# Patient Record
Sex: FEMALE | Race: BLACK OR AFRICAN AMERICAN | NOT HISPANIC OR LATINO | Employment: FULL TIME | URBAN - METROPOLITAN AREA
[De-identification: names, ages, dates, MRNs, and addresses within clinical notes are randomized per-mention and may not be internally consistent; named-entity substitution may affect disease eponyms.]

---

## 2017-06-30 ENCOUNTER — TELEPHONE (OUTPATIENT)
Dept: DERMATOLOGY | Facility: CLINIC | Age: 32
End: 2017-06-30

## 2017-06-30 NOTE — TELEPHONE ENCOUNTER
Contacted patient after receiving referral to schedule an appointment. Patient phone doesn't work.

## 2017-09-06 ENCOUNTER — OFFICE VISIT (OUTPATIENT)
Dept: PODIATRY | Facility: CLINIC | Age: 32
End: 2017-09-06
Payer: COMMERCIAL

## 2017-09-06 VITALS
WEIGHT: 202.81 LBS | DIASTOLIC BLOOD PRESSURE: 77 MMHG | BODY MASS INDEX: 37.32 KG/M2 | RESPIRATION RATE: 18 BRPM | HEIGHT: 62 IN | HEART RATE: 67 BPM | SYSTOLIC BLOOD PRESSURE: 127 MMHG

## 2017-09-06 DIAGNOSIS — Q82.8 POROKERATOSIS: ICD-10-CM

## 2017-09-06 DIAGNOSIS — B07.0 PLANTAR WART: Primary | ICD-10-CM

## 2017-09-06 PROCEDURE — 99999 PR PBB SHADOW E&M-EST. PATIENT-LVL III: CPT | Mod: PBBFAC,,, | Performed by: PODIATRIST

## 2017-09-06 PROCEDURE — 3008F BODY MASS INDEX DOCD: CPT | Mod: S$GLB,,, | Performed by: PODIATRIST

## 2017-09-06 PROCEDURE — 17110 DESTRUCTION B9 LES UP TO 14: CPT | Mod: S$GLB,,, | Performed by: PODIATRIST

## 2017-09-06 PROCEDURE — 99203 OFFICE O/P NEW LOW 30 MIN: CPT | Mod: 25,S$GLB,, | Performed by: PODIATRIST

## 2017-09-06 RX ORDER — UREA 40 %
CREAM (GRAM) TOPICAL 3 TIMES DAILY
Qty: 198.5 G | Refills: 4 | Status: SHIPPED | OUTPATIENT
Start: 2017-09-06 | End: 2017-09-06 | Stop reason: CLARIF

## 2017-09-06 RX ORDER — UREA 40 %
CREAM (GRAM) TOPICAL 3 TIMES DAILY
Qty: 198.5 G | Refills: 4 | Status: SHIPPED | OUTPATIENT
Start: 2017-09-06 | End: 2017-10-06

## 2017-09-06 NOTE — PROGRESS NOTES
Subjective:      Patient ID: Hilda Hercules is a 32 y.o. female.    Chief Complaint: Foot Problem (plantar wart on left foot x 1 year// No regular MD)    Hilda Hercules is a  32 y.o. year old female presenting to podiatry clinic for complaint of painful lesion on the bottom of the left foot for 1 years. The patient describes the pain as a 8/10 stabbing irritation mainly with weightbearing activities. The patient has Memory foam insoles. Patient does not recall any injuries or inciting events that may have triggered this problem.      Review of Systems   Constitution: Negative for chills and fever.   Cardiovascular: Negative for chest pain.   Respiratory: Negative for shortness of breath.    Gastrointestinal: Negative for nausea and vomiting.           Objective:      Physical Exam   Constitutional: She is oriented to person, place, and time. She appears well-developed and well-nourished. No distress.   Cardiovascular:   Pulses:       Dorsalis pedis pulses are 2+ on the right side, and 2+ on the left side.        Posterior tibial pulses are 2+ on the right side, and 2+ on the left side.   Capillary fill time 3 seconds to digits bilateral feet.   Musculoskeletal:   Lower extremities:    Deformities: Plantarflexed 5th met head left foot.    Normal arch height and rectus feet bilaterally.     5/5 muscle strength and tone in all four quadrants bilaterally.     Pain-free range of motion in all four quadrants WNL bilaterally.     Pain: Sub 5th met head worse with side to side compression left foot.   Neurological: She is alert and oriented to person, place, and time. She displays no Babinski's sign on the right side. She displays no Babinski's sign on the left side.   Plantar protective sensation by touch via 5.07 Linwood Laxmi monofilament present bilaterally.   Skin:   Lower extremities:    Normal turgor, texture, temperature bilaterally. Digital hair present bilaterally. Warm equally bilaterally.    No varicosities,  pigmentary changes.    Mild fullness sub 5th met head left foot.    No wounds, drainage, malodor, erythema, interdigital maceration were noted.     Keratotic build up sub 5th met head left foot with nucleation, displaced skin lines and punctate bleeding.    Nails are clear bilaterally.   Psychiatric: She has a normal mood and affect.   Nursing note and vitals reviewed.            Assessment:       Encounter Diagnoses   Name Primary?    Plantar wart Yes    Porokeratosis          Plan:       Hilda was seen today for foot problem.    Diagnoses and all orders for this visit:    Plantar wart    Porokeratosis    Other orders  -     Discontinue: urea (CARMOL) 40 % Crea; Apply topically 3 (three) times daily. Apply to rough skin on feet  -     urea (CARMOL) 40 % Crea; Apply topically 3 (three) times daily. Apply to rough skin on feet      I counseled the patient on her conditions, their implications and medical management.    At this time discussed treatment of keratotic verrucous lesion. The patient does wish to treat the painful verrucous lesions and proceed with chemical destruction of verrucous lesions x1. Following sharp debridement, three applications of phenol were topically applied to lesions followed by trichloroacetic acid. The lesions were offloaded with foam cut out pad and occluded with silk tape.  The patient was instructed to leave the lesions under occlusion for three days. The patient was instructed to remove the dressings earlier and thoroughly cleanse the area if any excessive irritation occurs.  The patient was also given a prescription for urea lotion to apply to the lesion site after removal of dressing.           .

## 2017-09-06 NOTE — PATIENT INSTRUCTIONS
Keep dressings clean, dry, and intact for 3-4 days. Apply urea lotion as directed following removal of dressings. Remove dressings earlier and cleanse thoroughly if you experience any burning or irritation.    Apply cream or lotion to skin lesions as directed.  Eucerin or Amlactin as alternatives.

## 2017-12-13 ENCOUNTER — OFFICE VISIT (OUTPATIENT)
Dept: OPHTHALMOLOGY | Facility: CLINIC | Age: 32
End: 2017-12-13
Payer: COMMERCIAL

## 2017-12-13 DIAGNOSIS — H52.209 MYOPIA WITH ASTIGMATISM, UNSPECIFIED LATERALITY: ICD-10-CM

## 2017-12-13 DIAGNOSIS — Z01.00 ENCOUNTER FOR EYE EXAM: Primary | ICD-10-CM

## 2017-12-13 DIAGNOSIS — H52.10 MYOPIA WITH ASTIGMATISM, UNSPECIFIED LATERALITY: ICD-10-CM

## 2017-12-13 DIAGNOSIS — Z46.0 ENCOUNTER FOR FITTING OR ADJUSTMENT OF SPECTACLES OR CONTACT LENSES: ICD-10-CM

## 2017-12-13 PROCEDURE — 99999 PR PBB SHADOW E&M-EST. PATIENT-LVL I: CPT | Mod: PBBFAC,,, | Performed by: OPTOMETRIST

## 2017-12-13 PROCEDURE — 92015 DETERMINE REFRACTIVE STATE: CPT | Mod: S$GLB,,, | Performed by: OPTOMETRIST

## 2017-12-13 PROCEDURE — 92004 COMPRE OPH EXAM NEW PT 1/>: CPT | Mod: S$GLB,,, | Performed by: OPTOMETRIST

## 2017-12-13 PROCEDURE — 92310 CONTACT LENS FITTING OU: CPT | Mod: ,,, | Performed by: OPTOMETRIST

## 2017-12-13 NOTE — PROGRESS NOTES
HPI     No visual complaints. New patient last eye exam 2016. Update glasses and   contact lenses RX.  Discussed fee to update contact lenses.    Last edited by Ned Church, OD on 12/13/2017 11:08 AM. (History)            Assessment /Plan     For exam results, see Encounter Report.    Encounter for eye exam    Encounter for fitting or adjustment of spectacles or contact lenses    Myopia with astigmatism, unspecified laterality      Pt states previous contacts brand was Air Optix.  After attempting to fit with Air Optix Astig, pt says she has always worn color lenses.    Trials of Air Optix vision was slightly blurry due to not having astig correction.    Dispensed Rx for Freshlook Colorblends.    Discussed CL charges.  Dispense Final Rx for glasses  Note changes CL Rx  RTC 1 year DFE

## 2018-01-23 ENCOUNTER — TELEPHONE (OUTPATIENT)
Dept: DERMATOLOGY | Facility: CLINIC | Age: 33
End: 2018-01-23

## 2018-11-07 ENCOUNTER — OFFICE VISIT (OUTPATIENT)
Dept: INTERNAL MEDICINE | Facility: CLINIC | Age: 33
End: 2018-11-07
Payer: COMMERCIAL

## 2018-11-07 ENCOUNTER — CLINICAL SUPPORT (OUTPATIENT)
Dept: INTERNAL MEDICINE | Facility: CLINIC | Age: 33
End: 2018-11-07
Payer: COMMERCIAL

## 2018-11-07 ENCOUNTER — APPOINTMENT (OUTPATIENT)
Dept: RADIOLOGY | Facility: HOSPITAL | Age: 33
End: 2018-11-07
Attending: INTERNAL MEDICINE
Payer: COMMERCIAL

## 2018-11-07 ENCOUNTER — LAB VISIT (OUTPATIENT)
Dept: LAB | Facility: HOSPITAL | Age: 33
End: 2018-11-07
Attending: INTERNAL MEDICINE
Payer: COMMERCIAL

## 2018-11-07 VITALS
TEMPERATURE: 99 F | HEART RATE: 80 BPM | SYSTOLIC BLOOD PRESSURE: 110 MMHG | BODY MASS INDEX: 36.58 KG/M2 | DIASTOLIC BLOOD PRESSURE: 76 MMHG | WEIGHT: 200 LBS

## 2018-11-07 DIAGNOSIS — R05.9 COUGH: ICD-10-CM

## 2018-11-07 DIAGNOSIS — Z76.89 ENCOUNTER TO ESTABLISH CARE: ICD-10-CM

## 2018-11-07 DIAGNOSIS — Z76.89 ENCOUNTER TO ESTABLISH CARE: Primary | ICD-10-CM

## 2018-11-07 DIAGNOSIS — R07.89 OTHER CHEST PAIN: ICD-10-CM

## 2018-11-07 DIAGNOSIS — F17.200 CURRENT EVERY DAY SMOKER: ICD-10-CM

## 2018-11-07 DIAGNOSIS — E66.9 OBESITY (BMI 35.0-39.9 WITHOUT COMORBIDITY): ICD-10-CM

## 2018-11-07 LAB
25(OH)D3+25(OH)D2 SERPL-MCNC: 6 NG/ML
ALBUMIN SERPL BCP-MCNC: 4.1 G/DL
ALP SERPL-CCNC: 55 U/L
ALT SERPL W/O P-5'-P-CCNC: 11 U/L
ANION GAP SERPL CALC-SCNC: 9 MMOL/L
AST SERPL-CCNC: 17 U/L
BASOPHILS # BLD AUTO: 0.06 K/UL
BASOPHILS NFR BLD: 0.8 %
BILIRUB SERPL-MCNC: 0.3 MG/DL
BUN SERPL-MCNC: 10 MG/DL
CALCIUM SERPL-MCNC: 9.3 MG/DL
CHLORIDE SERPL-SCNC: 104 MMOL/L
CHOLEST SERPL-MCNC: 160 MG/DL
CHOLEST/HDLC SERPL: 3 {RATIO}
CO2 SERPL-SCNC: 25 MMOL/L
CREAT SERPL-MCNC: 0.8 MG/DL
DIFFERENTIAL METHOD: ABNORMAL
EOSINOPHIL # BLD AUTO: 0.2 K/UL
EOSINOPHIL NFR BLD: 2.8 %
ERYTHROCYTE [DISTWIDTH] IN BLOOD BY AUTOMATED COUNT: 16 %
EST. GFR  (AFRICAN AMERICAN): >60 ML/MIN/1.73 M^2
EST. GFR  (NON AFRICAN AMERICAN): >60 ML/MIN/1.73 M^2
GLUCOSE SERPL-MCNC: 75 MG/DL
HCT VFR BLD AUTO: 43 %
HDLC SERPL-MCNC: 54 MG/DL
HDLC SERPL: 33.8 %
HGB BLD-MCNC: 14.4 G/DL
IMM GRANULOCYTES # BLD AUTO: 0.02 K/UL
IMM GRANULOCYTES NFR BLD AUTO: 0.3 %
LDLC SERPL CALC-MCNC: 94.4 MG/DL
LYMPHOCYTES # BLD AUTO: 3.1 K/UL
LYMPHOCYTES NFR BLD: 39.9 %
MCH RBC QN AUTO: 27.5 PG
MCHC RBC AUTO-ENTMCNC: 33.5 G/DL
MCV RBC AUTO: 82 FL
MONOCYTES # BLD AUTO: 0.9 K/UL
MONOCYTES NFR BLD: 11.5 %
NEUTROPHILS # BLD AUTO: 3.5 K/UL
NEUTROPHILS NFR BLD: 44.7 %
NONHDLC SERPL-MCNC: 106 MG/DL
NRBC BLD-RTO: 0 /100 WBC
PLATELET # BLD AUTO: 245 K/UL
PMV BLD AUTO: 12.8 FL
POTASSIUM SERPL-SCNC: 3.5 MMOL/L
PROT SERPL-MCNC: 7.8 G/DL
RBC # BLD AUTO: 5.24 M/UL
SODIUM SERPL-SCNC: 138 MMOL/L
TRIGL SERPL-MCNC: 58 MG/DL
WBC # BLD AUTO: 7.82 K/UL

## 2018-11-07 PROCEDURE — 99999 PR PBB SHADOW E&M-EST. PATIENT-LVL III: CPT | Mod: PBBFAC,,, | Performed by: INTERNAL MEDICINE

## 2018-11-07 PROCEDURE — 93005 ELECTROCARDIOGRAM TRACING: CPT | Mod: S$GLB,,, | Performed by: INTERNAL MEDICINE

## 2018-11-07 PROCEDURE — 99204 OFFICE O/P NEW MOD 45 MIN: CPT | Mod: S$GLB,,, | Performed by: INTERNAL MEDICINE

## 2018-11-07 PROCEDURE — 85025 COMPLETE CBC W/AUTO DIFF WBC: CPT

## 2018-11-07 PROCEDURE — 3008F BODY MASS INDEX DOCD: CPT | Mod: CPTII,S$GLB,, | Performed by: INTERNAL MEDICINE

## 2018-11-07 PROCEDURE — 80061 LIPID PANEL: CPT

## 2018-11-07 PROCEDURE — 71046 X-RAY EXAM CHEST 2 VIEWS: CPT | Mod: 26,,, | Performed by: RADIOLOGY

## 2018-11-07 PROCEDURE — 36415 COLL VENOUS BLD VENIPUNCTURE: CPT | Mod: PO

## 2018-11-07 PROCEDURE — 80053 COMPREHEN METABOLIC PANEL: CPT

## 2018-11-07 PROCEDURE — 71046 X-RAY EXAM CHEST 2 VIEWS: CPT | Mod: TC,PO

## 2018-11-07 PROCEDURE — 93010 ELECTROCARDIOGRAM REPORT: CPT | Mod: S$GLB,,, | Performed by: INTERNAL MEDICINE

## 2018-11-07 PROCEDURE — 82306 VITAMIN D 25 HYDROXY: CPT

## 2018-11-07 RX ORDER — BUPROPION HYDROCHLORIDE 150 MG/1
150 TABLET, EXTENDED RELEASE ORAL 2 TIMES DAILY
Qty: 60 TABLET | Refills: 2 | Status: SHIPPED | OUTPATIENT
Start: 2018-11-07 | End: 2019-05-17 | Stop reason: SDUPTHER

## 2018-11-07 RX ORDER — BENZONATATE 100 MG/1
200 CAPSULE ORAL 3 TIMES DAILY PRN
Qty: 30 CAPSULE | Refills: 0 | Status: SHIPPED | OUTPATIENT
Start: 2018-11-07 | End: 2018-11-17

## 2018-11-07 NOTE — PROGRESS NOTES
Patient Goals:  1. Decrease weight  2. Stop smoking  3.    Usual Diet:  Breakfast- coffee  Lunch- coffee  Dinner- baked food, full meal with Vegetables.   Snacks- cigarette    How frequently do you eat out? Twice a week      How much water do you usually drink a day? 1/2 a cup      How often do you complete physical activity?  Minutes-20  How many times a week?-  Once

## 2018-11-07 NOTE — PROGRESS NOTES
Subjective:      Patient ID: Hilda Hercules is a 33 y.o. female.    Chief Complaint: Cough and Nasal Congestion      HPI     Ms. Hilda Hercules presents due to cough and nasal congestion and to establish primary care. She reports her sx started Sunday am with a sore throat, subsequently noted nasal congestion and yesterday developed a cough productive of a thick, yellow and clear mucous associated with a pleuritic CP. No fever, but +chills. She reports taking acetaminophen 500 mg 1-2 po qd. She also tried an OTC anti-histamine on Sunday, which transferred her sx from her nose to the chest. No n/v/d. +sob, +gas    She reports quitting smoking in the past once with Bupropione and once with 14 mg nicotine patch, both of which were very effective, particularly the bupropione, which made her forget she was even a smoker until she found her pack.      Past Medical History:   Diagnosis Date    Current every day smoker     Tried to quit 2x w/ help of bupropione and nicotine patches; vaping didn't help     Past Surgical History:   Procedure Laterality Date    TUBAL LIGATION       Social History     Socioeconomic History    Marital status: Single     Spouse name: Not on file    Number of children: Not on file    Years of education: Not on file    Highest education level: Not on file   Social Needs    Financial resource strain: Not on file    Food insecurity - worry: Not on file    Food insecurity - inability: Not on file    Transportation needs - medical: Not on file    Transportation needs - non-medical: Not on file   Occupational History    Not on file   Tobacco Use    Smoking status: Heavy Tobacco Smoker     Packs/day: 0.50     Years: 20.00     Pack years: 10.00     Types: Cigarettes    Smokeless tobacco: Current User    Tobacco comment: Quit 2 times, including over the weekend with help of patches   Substance and Sexual Activity    Alcohol use: Yes    Drug use: Not on file    Sexual activity: Not on file    Other Topics Concern    Not on file   Social History Narrative    Patient goal(s): Decrease weight; complete masters in healthcare admin; raise her 3 daughters; quit smoking forever        Motivation for goals (0-10): 10        Breakfast: Coffee w/ 4 unsweetened and 2 tsp Hazelnut or 1/2 & 1/2 & no sugar    Lunch: Coffee w/ 4 unsweetened and 2 tsp Hazelnut or 1/2 & 1/2 & no sugar    Dinner: Baked chicken or pork or beef or fish with vegetables; Coffee w/ 4 unsweetened and 2 tsp Hazelnut or 1/2 & 1/2 & no sugar    Snacks: Cigarettes    Eating out: 2x/wk    Water: 1/2 cup/d    Physical Activity 20 min/d & 1d/wk         Family History   Problem Relation Age of Onset    Hypertension Mother     Diabetes Mother     Hypertension Father     Diabetes Father     Hypertension Sister     Cataracts Maternal Grandmother     Coronary artery disease Maternal Grandmother 50    Hypertension Sister     Hypertension Sister     Stroke Paternal Grandmother 45       Current Outpatient Medications:     benzonatate (TESSALON) 100 MG capsule, Take 2 capsules (200 mg total) by mouth 3 (three) times daily as needed for Cough., Disp: 30 capsule, Rfl: 0    buPROPion (WELLBUTRIN SR) 150 MG TBSR 12 hr tablet, Take 1 tablet (150 mg total) by mouth 2 (two) times daily., Disp: 60 tablet, Rfl: 2    Review of patient's allergies indicates:  No Known Allergies     Review of Systems   All remaining systems negative    Objective:     /76 (BP Location: Left arm, Patient Position: Sitting)   Pulse 80   Temp 98.6 °F (37 °C)   Wt 90.7 kg (200 lb)   BMI 36.58 kg/m²     Physical Exam  GEN: A&O fully, NAD  PSYC: Normal affect  HEENT: OP: Clear, no LAD, no thyroid masses, auditory canals and TMs WNL  CV: RRR, no M/G/R  PULM: CTA bilaterally, no wheezes, rales, crackles       Assessment:      1. Encounter to establish care    2. Current every day smoker: She is highly motivated to completely quit forever. Risks and benefits discussed and  patient chose to move forward with bupropione 1 po qd x 4 days, and then 1 po BID, quit smoking 1 week into tx (total of 3 months) and destroy or give away all cigarettes, charito treys, lighters, etc.   3. Cough: Likely viral, but bacterial etiologies are in the DDx given current smoking hx and acetaminophen use, which may be masking a fever. Risks and benefits discussed and patient chose to move forward with CXR and Tessalon Perles and to avoid ABx and Robitussin AC at this time. Recommend the following lifestyle modification:      Ginger/lemon/honey Juice          Ingredients (preferably organic & local):    3-5 Ginger roots   3 patrizia and honey      Preparation:    Ginger root   - Wash   - Chop   - Add to  with an equal proportion of water   - Blend   - Strain   - Pour to fill ¾ of any size container (i.e. glass bottle)    Patrizia   - Squeeze patrizia    - Pour juice to fill ¼ of glass bottle    Add honey to glass bottle to taste      Storage & Application    Refrigerate   Enjoy   - Shake & sip as needed for cough, congestion, sore throat (also good for BPH)   - Avoid drinking >3 oz in one sitting, which can lead to gastrointestinal irritation     4. Other chest pain: Likely pleuritic from coughing. Will check as baseline, particularly since she may need a Z-rudy.   5.      Obesity (BMI 35.0-39.9 without comorbidity): Discussed strategies for lifestyle modifications,            particularly systematically increasing physical activity (5-90 min/episode, 3-5 times/week), water            (1/2 of body weight in ounces) and avoiding potatoes, sugar sweetened beverages, red/processed            meats (including chicken), fast food, grains (rice/bread/pasta); and increasing yogurt, whole fruits,            unsalted nuts to 3-5 servings/day, and daily weight logging; non-starchy vegetables, cooked beans,            and un-fried seafood have weak effects on weight.      Plan:   Encounter to establish care  -      CBC auto differential; Future; Expected date: 11/07/2018  -     Comprehensive metabolic panel; Future; Expected date: 11/07/2018  -     Lipid panel; Future; Expected date: 11/07/2018  -     Vitamin D; Future; Expected date: 11/07/2018    Current every day smoker  -     buPROPion (WELLBUTRIN SR) 150 MG TBSR 12 hr tablet; Take 1 tablet (150 mg total) by mouth 2 (two) times daily.  Dispense: 60 tablet; Refill: 2  -     X-Ray Chest PA And Lateral; Future; Expected date: 11/07/2018  -     IN OFFICE EKG 12-LEAD (to Muse)    Cough  -     X-Ray Chest PA And Lateral; Future; Expected date: 11/07/2018  -     benzonatate (TESSALON) 100 MG capsule; Take 2 capsules (200 mg total) by mouth 3 (three) times daily as needed for Cough.  Dispense: 30 capsule; Refill: 0    Other chest pain  -     IN OFFICE EKG 12-LEAD (to Muse)    Obesity (BMI 35.0-39.9 without comorbidity)      Follow-up in about 3 months (around 2/7/2019), or if symptoms worsen or fail to improve, for FU on obesity.    I spent 25 minutes of time with patient 50% or more of which was discussing labs and plans of care.      ADDENDUM:  ECG 11/7/18: NSR 79 bpm, nL axis, QTc 458 ms

## 2018-11-08 PROBLEM — R94.31 PROLONGED Q-T INTERVAL ON ECG: Status: ACTIVE | Noted: 2018-11-07

## 2018-11-08 PROBLEM — E55.9 VITAMIN D DEFICIENCY: Status: ACTIVE | Noted: 2018-11-08

## 2018-11-08 PROBLEM — E66.01 SEVERE OBESITY (BMI 35.0-39.9) WITH COMORBIDITY: Status: ACTIVE | Noted: 2018-11-08

## 2018-12-06 ENCOUNTER — OFFICE VISIT (OUTPATIENT)
Dept: OBSTETRICS AND GYNECOLOGY | Facility: CLINIC | Age: 33
End: 2018-12-06
Payer: COMMERCIAL

## 2018-12-06 VITALS
SYSTOLIC BLOOD PRESSURE: 112 MMHG | DIASTOLIC BLOOD PRESSURE: 68 MMHG | WEIGHT: 202.81 LBS | HEIGHT: 62 IN | BODY MASS INDEX: 37.32 KG/M2

## 2018-12-06 DIAGNOSIS — Z72.51 HIGH RISK HETEROSEXUAL BEHAVIOR: ICD-10-CM

## 2018-12-06 DIAGNOSIS — Z01.419 ENCOUNTER FOR GYNECOLOGICAL EXAMINATION (GENERAL) (ROUTINE) WITHOUT ABNORMAL FINDINGS: Primary | ICD-10-CM

## 2018-12-06 DIAGNOSIS — Z12.4 SCREENING FOR CERVICAL CANCER: ICD-10-CM

## 2018-12-06 DIAGNOSIS — Z11.3 SCREENING FOR GONORRHEA: ICD-10-CM

## 2018-12-06 DIAGNOSIS — N76.0 BACTERIAL VAGINOSIS: ICD-10-CM

## 2018-12-06 DIAGNOSIS — B96.89 BACTERIAL VAGINOSIS: ICD-10-CM

## 2018-12-06 PROCEDURE — 87491 CHLMYD TRACH DNA AMP PROBE: CPT

## 2018-12-06 PROCEDURE — 88175 CYTOPATH C/V AUTO FLUID REDO: CPT | Performed by: PATHOLOGY

## 2018-12-06 PROCEDURE — 87660 TRICHOMONAS VAGIN DIR PROBE: CPT

## 2018-12-06 PROCEDURE — 99385 PREV VISIT NEW AGE 18-39: CPT | Mod: S$GLB,,, | Performed by: OBSTETRICS & GYNECOLOGY

## 2018-12-06 PROCEDURE — 88141 CYTOPATH C/V INTERPRET: CPT | Mod: ,,, | Performed by: PATHOLOGY

## 2018-12-06 PROCEDURE — 87624 HPV HI-RISK TYP POOLED RSLT: CPT

## 2018-12-06 PROCEDURE — 99999 PR PBB SHADOW E&M-EST. PATIENT-LVL III: CPT | Mod: PBBFAC,,, | Performed by: OBSTETRICS & GYNECOLOGY

## 2018-12-06 NOTE — PROGRESS NOTES
"Subjective:       Patient ID: Hilda Hercules is a 33 y.o. female.    Chief Complaint:  Well Woman      History of Present Illness  HPI  Annual Exam-Premenopausal  Patient presents for annual exam. The patient c/o watery discharge for 1+ month; +douche--due to odor and presence of discharge; . The patient is sexually active--tl for contraception; no condoms last partner of 8 months, not currently sexually active; . GYN screening history: last pap: was normal and patient does not recall when last pap was. (hx of abnl pap 6 yrs ago; colposcopy);  The patient wears seatbelts: yes. The patient participates in regular exercise: no. --plans to start; Has the patient ever been transfused or tattooed?: no. The patient reports that there is not domestic violence in her life.      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   Date of Last Pap: No result found    OB History    Para Term  AB Living   3 3 3     3   SAB TAB Ectopic Multiple Live Births           3      # Outcome Date GA Lbr Layo/2nd Weight Sex Delivery Anes PTL Lv   3 Term 07    F Vag-Spont   ONEL   2 Term 04    F Vag-Spont   ONEL   1 Term 03    F Vag-Spont   ONEL          Review of Systems  Review of Systems   Genitourinary: Positive for vaginal discharge and urinary incontinence.   All other systems reviewed and are negative.          Objective:      Physical Exam:   Constitutional: She appears well-developed.     Eyes: Conjunctivae and EOM are normal. Pupils are equal, round, and reactive to light.    Neck: Normal range of motion. Neck supple.     Pulmonary/Chest: Effort normal. Right breast exhibits no mass, no nipple " discharge, no skin change and no tenderness. Left breast exhibits no mass, no nipple discharge, no skin change and no tenderness. Breasts are symmetrical.        Abdominal: Soft.     Genitourinary: Rectum normal, vagina normal and uterus normal. Pelvic exam was performed with patient supine. Cervix is normal. Right adnexum displays no mass and no tenderness. Left adnexum displays no mass and no tenderness. No erythema, bleeding, rectocele, cystocele or unspecified prolapse of vaginal walls in the vagina. No vaginal discharge (white) found. Labial bartholins normal.Additional cervical findings: pap smear done       Uterus Size: 6 cm   Musculoskeletal: Normal range of motion.       Neurological: She is alert.    Skin: Skin is warm.    Psychiatric: She has a normal mood and affect.           Assessment:     Encounter Diagnoses   Name Primary?    Encounter for gynecological examination (general) (routine) without abnormal findings Yes    Screening for cervical cancer     Screening for gonorrhea     Bacterial vaginosis     High risk heterosexual behavior                Plan:      Continue annual well woman exam.  Pap today; Reviewed updated recommendations for pap smears (every 3 years) in low risk patients.   Recommend annual pelvic exams.  Reviewed recommendations for annual CBE.  Gc/ct/affirm today  Safe sex  Continue menstrual calendar  Reviewed kegel exercises  Continue diet, exercise, weight loss

## 2018-12-07 ENCOUNTER — TELEPHONE (OUTPATIENT)
Dept: OBSTETRICS AND GYNECOLOGY | Facility: CLINIC | Age: 33
End: 2018-12-07

## 2018-12-07 DIAGNOSIS — N76.0 BACTERIAL VAGINOSIS: Primary | ICD-10-CM

## 2018-12-07 DIAGNOSIS — B96.89 BACTERIAL VAGINOSIS: Primary | ICD-10-CM

## 2018-12-07 LAB
C TRACH DNA SPEC QL NAA+PROBE: NOT DETECTED
CANDIDA RRNA VAG QL PROBE: NEGATIVE
G VAGINALIS RRNA GENITAL QL PROBE: POSITIVE
N GONORRHOEA DNA SPEC QL NAA+PROBE: NOT DETECTED
T VAGINALIS RRNA GENITAL QL PROBE: NEGATIVE

## 2018-12-07 RX ORDER — METRONIDAZOLE 500 MG/1
500 TABLET ORAL EVERY 12 HOURS
Qty: 14 TABLET | Refills: 0 | Status: SHIPPED | OUTPATIENT
Start: 2018-12-07 | End: 2018-12-14

## 2018-12-07 NOTE — TELEPHONE ENCOUNTER
----- Message from Faith Fink MD sent at 12/7/2018  8:33 AM CST -----  Your vaginal culture is positive for bacterial vaginosis.  This is overgrowth of your normal bacteria.  Do you prefer pills or vaginal gel for treatment?

## 2018-12-12 LAB
HPV HR 12 DNA CVX QL NAA+PROBE: NEGATIVE
HPV16 AG SPEC QL: POSITIVE
HPV18 DNA SPEC QL NAA+PROBE: NEGATIVE

## 2018-12-24 ENCOUNTER — TELEPHONE (OUTPATIENT)
Dept: OBSTETRICS AND GYNECOLOGY | Facility: CLINIC | Age: 33
End: 2018-12-24

## 2018-12-24 NOTE — TELEPHONE ENCOUNTER
Left messages for the pt. to call back. asha thakkar message sent    Please advise pt that her pap is mildly normal but  detecting the HPV virus.  Please schedule pt a colpo procedure (a procedure where dr hardy looks at cervix through the microscope); remind pt to premedicate with nsaid prior to appt and that she cannot be on menses for the procedure

## 2018-12-24 NOTE — TELEPHONE ENCOUNTER
----- Message from Yamile Sands sent at 12/24/2018  8:04 AM CST -----  Contact: Pt  Pt states she had a missed call from nurse, pt asked that nurse return call.

## 2019-05-17 ENCOUNTER — OFFICE VISIT (OUTPATIENT)
Dept: INTERNAL MEDICINE | Facility: CLINIC | Age: 34
End: 2019-05-17
Payer: COMMERCIAL

## 2019-05-17 VITALS
BODY MASS INDEX: 36.29 KG/M2 | HEIGHT: 63 IN | DIASTOLIC BLOOD PRESSURE: 81 MMHG | WEIGHT: 204.81 LBS | HEART RATE: 67 BPM | SYSTOLIC BLOOD PRESSURE: 137 MMHG | TEMPERATURE: 98 F

## 2019-05-17 DIAGNOSIS — B36.0 TINEA VERSICOLOR: Primary | ICD-10-CM

## 2019-05-17 DIAGNOSIS — E66.9 OBESITY (BMI 35.0-39.9 WITHOUT COMORBIDITY): ICD-10-CM

## 2019-05-17 DIAGNOSIS — F17.200 CURRENT EVERY DAY SMOKER: ICD-10-CM

## 2019-05-17 PROCEDURE — 3008F BODY MASS INDEX DOCD: CPT | Mod: CPTII,S$GLB,, | Performed by: INTERNAL MEDICINE

## 2019-05-17 PROCEDURE — 99214 OFFICE O/P EST MOD 30 MIN: CPT | Mod: S$GLB,,, | Performed by: INTERNAL MEDICINE

## 2019-05-17 PROCEDURE — 99214 PR OFFICE/OUTPT VISIT, EST, LEVL IV, 30-39 MIN: ICD-10-PCS | Mod: S$GLB,,, | Performed by: INTERNAL MEDICINE

## 2019-05-17 PROCEDURE — 99999 PR PBB SHADOW E&M-EST. PATIENT-LVL III: ICD-10-PCS | Mod: PBBFAC,,, | Performed by: INTERNAL MEDICINE

## 2019-05-17 PROCEDURE — 3008F PR BODY MASS INDEX (BMI) DOCUMENTED: ICD-10-PCS | Mod: CPTII,S$GLB,, | Performed by: INTERNAL MEDICINE

## 2019-05-17 PROCEDURE — 99999 PR PBB SHADOW E&M-EST. PATIENT-LVL III: CPT | Mod: PBBFAC,,, | Performed by: INTERNAL MEDICINE

## 2019-05-17 RX ORDER — BUPROPION HYDROCHLORIDE 150 MG/1
150 TABLET, EXTENDED RELEASE ORAL 2 TIMES DAILY
Qty: 60 TABLET | Refills: 2 | Status: SHIPPED | OUTPATIENT
Start: 2019-05-17 | End: 2019-08-15

## 2019-05-17 RX ORDER — KETOCONAZOLE 20 MG/ML
SHAMPOO, SUSPENSION TOPICAL
Qty: 120 ML | Refills: 0 | Status: SHIPPED | OUTPATIENT
Start: 2019-05-20

## 2019-05-17 RX ORDER — KETOCONAZOLE 20 MG/G
CREAM TOPICAL DAILY
Qty: 30 G | Refills: 0 | Status: SHIPPED | OUTPATIENT
Start: 2019-05-17

## 2019-05-17 RX ORDER — TRIAMCINOLONE ACETONIDE 1 MG/G
OINTMENT TOPICAL 2 TIMES DAILY
Qty: 15 G | Refills: 0 | Status: SHIPPED | OUTPATIENT
Start: 2019-05-17

## 2019-05-17 NOTE — PROGRESS NOTES
Subjective:      Patient ID: Hilda Hercules is a 33 y.o. female.    Chief Complaint: No chief complaint on file.      HPI     Ms. Hilda Hercules is a patient of Shine Byrd MD, who presents for skin.    She reports an improvement in mood, energy level, etc. Since supplementing vitamin D 2000 IU 0-2 tab qd.    She reports smoking 0.33 PPD, which she would like to quit. She wasn't able to purchase Wellbutrin due to expense, but has since found a mail-order pharmacy that is affordable.     She reports splotches on her back, face, & neck, which starts out as a lighter spot and later becomes black and is itchy.      Past Medical History:   Diagnosis Date    Current every day smoker     Tried to quit 2x w/ help of bupropione and nicotine patches; vaping didn't help    Prolonged Q-T interval on ECG 11/07/2018    Severe obesity (BMI 35.0-39.9) with comorbidity 11/08/2018    Vitamin D deficiency 11/08/2018    6 ng/mL; rec vitD3 2000 IU qd; consider recheck in 3 months and if not >10 to tx w/ vitD2     Past Surgical History:   Procedure Laterality Date    TUBAL LIGATION  2007     Social History     Socioeconomic History    Marital status: Single     Spouse name: Not on file    Number of children: Not on file    Years of education: Not on file    Highest education level: Not on file   Occupational History    Not on file   Social Needs    Financial resource strain: Not on file    Food insecurity:     Worry: Not on file     Inability: Not on file    Transportation needs:     Medical: Not on file     Non-medical: Not on file   Tobacco Use    Smoking status: Heavy Tobacco Smoker     Packs/day: 0.50     Years: 20.00     Pack years: 10.00     Types: Cigarettes    Smokeless tobacco: Never Used    Tobacco comment: Quit 2 times, including over the weekend with help of patches   Substance and Sexual Activity    Alcohol use: Yes    Drug use: No    Sexual activity: Yes     Partners: Male   Lifestyle    Physical  activity:     Days per week: Not on file     Minutes per session: Not on file    Stress: Not on file   Relationships    Social connections:     Talks on phone: Not on file     Gets together: Not on file     Attends [employer] service: Not on file     Active member of club or organization: Not on file     Attends meetings of clubs or organizations: Not on file     Relationship status: Not on file   Other Topics Concern    Not on file   Social History Narrative    Patient goal(s): Decrease weight; complete masters in healthcare admin; raise her 3 daughters; quit smoking forever        Motivation for goals (0-10): 10        Breakfast: Coffee w/ 4 unsweetened and 2 tsp Hazelnut or 1/2 & 1/2 & no sugar    Lunch: (MN 7x/wk): Coffee w/ 4 unsweetened and 2 tsp Hazelnut or 1/2 & 1/2 & no sugar    Dinner: Baked chicken or pork or beef or fish with vegetables &/or fruit; Coffee w/ 4 unsweetened and 2 tsp Hazelnut or 1/2 & 1/2 & no sugar    Snacks: Cigarettes    Eating out: 2x/wk    Water: 1/2 cup/d    Physical Activity 20 min/d & 1d/wk         Family History   Problem Relation Age of Onset    Hypertension Mother     Diabetes Mother     Hypertension Father     Diabetes Father     Hypertension Sister     Cataracts Maternal Grandmother     Coronary artery disease Maternal Grandmother 50    Hypertension Sister     Hypertension Sister     Stroke Paternal Grandmother 45       Current Outpatient Medications:     buPROPion (WELLBUTRIN SR) 150 MG TBSR 12 hr tablet, Take 1 tablet (150 mg total) by mouth 2 (two) times daily., Disp: 60 tablet, Rfl: 2    ketoconazole (NIZORAL) 2 % cream, Apply topically once daily., Disp: 30 g, Rfl: 0    [START ON 5/20/2019] ketoconazole (NIZORAL) 2 % shampoo, Apply topically twice a week., Disp: 120 mL, Rfl: 0    triamcinolone acetonide 0.1% (KENALOG) 0.1 % ointment, Apply topically 2 (two) times daily., Disp: 15 g, Rfl: 0    Review of patient's allergies indicates:   Allergen Reactions  "   Codeine Nausea And Vomiting     Photosensitivity, headache, elevated blood pressure           Review of Systems   All remaining systems negative    Objective:     /81 (BP Location: Left arm, Patient Position: Sitting, BP Method: Medium (Manual))   Pulse 67   Temp 98.3 °F (36.8 °C) (Tympanic)   Ht 5' 3" (1.6 m)   Wt 92.9 kg (204 lb 12.9 oz)   BMI 36.28 kg/m²     Physical Exam  GEN: A&O fully, NAD  PSYC: Normal affect      Lab Results   Component Value Date    WBC 7.82 11/07/2018    HGB 14.4 11/07/2018    HCT 43.0 11/07/2018     11/07/2018    CHOL 160 11/07/2018    TRIG 58 11/07/2018    HDL 54 11/07/2018    LDLCALC 94.4 11/07/2018    ALT 11 11/07/2018    AST 17 11/07/2018     11/07/2018    K 3.5 11/07/2018     11/07/2018    CREATININE 0.8 11/07/2018    BUN 10 11/07/2018    CO2 25 11/07/2018    CALCIUM 9.3 11/07/2018       Assessment:      1. Tinea versicolor: Risks and benefits discussed and patient chose to move forward with ketaconazole shampoo.    2. Current every day smoker: Risks and benefits discussed and patient chose to move forward with bupropion 150 mg 1 po BID, which she understands to take 1 po QD x 3 days, then BID and quit on day #7.    3.      Obesity: Discussed strategies for lifestyle modifications, particularly systematically increasing physical activity (5-90 min/episode, 3-5 times/week), water (1/2 of body weight in ounces) and avoiding potatoes, sugar sweetened beverages, red/processed meats (including chicken), fast food, grains (rice/bread/pasta); and increasing yogurt, whole fruits, unsalted nuts to 3-5 servings/day, and daily weight logging; non-starchy vegetables, cooked beans, and un-fried seafood have weak effects on weight.      Plan:   Tinea versicolor  -     ketoconazole (NIZORAL) 2 % cream; Apply topically once daily.  Dispense: 30 g; Refill: 0  -     ketoconazole (NIZORAL) 2 % shampoo; Apply topically twice a week.  Dispense: 120 mL; Refill: 0  -     " triamcinolone acetonide 0.1% (KENALOG) 0.1 % ointment; Apply topically 2 (two) times daily.  Dispense: 15 g; Refill: 0    Current every day smoker  -     buPROPion (WELLBUTRIN SR) 150 MG TBSR 12 hr tablet; Take 1 tablet (150 mg total) by mouth 2 (two) times daily.  Dispense: 60 tablet; Refill: 2    Obesity (BMI 35.0-39.9 without comorbidity)        Follow up in about 3 months (around 8/17/2019), or if symptoms worsen or fail to improve, for FU on obesity.    I spent 25 minutes of time with patient 50% or more of which was discussing labs and plans of care.